# Patient Record
(demographics unavailable — no encounter records)

---

## 2025-03-26 NOTE — DISCUSSION/SUMMARY
[de-identified] :  I reviewed patient's radiographs and discussed his treatment course with patient and his father.  Defer further imaging or formal course of physical therapy.  I advised obtaining cushioned gel heel cups in all shoewear.  May resume activities as tolerated.  Recommended Achilles tendon stretching and applying ice as needed.  Follow up as needed.  Patient and his parent voiced understanding and agreement with the plan.

## 2025-03-26 NOTE — HISTORY OF PRESENT ILLNESS
[de-identified] : Patient presents for LT heel pain evaluation. Patient states that he has been having LT heel pain since basketball season started, since last weekend. Patient states now after the season ended he still has pain that is lingering.

## 2025-03-26 NOTE — PHYSICAL EXAM
[5___] : eversion 5[unfilled]/5 [2+] : posterior tibialis pulse: 2+ [] : patient ambulates without assistive device [Left] : left foot [There are no fractures, subluxations or dislocations. No significant abnormalities are seen] : There are no fractures, subluxations or dislocations. No significant abnormalities are seen [de-identified] : calcaneal apophysitis